# Patient Record
Sex: FEMALE | Race: WHITE
[De-identification: names, ages, dates, MRNs, and addresses within clinical notes are randomized per-mention and may not be internally consistent; named-entity substitution may affect disease eponyms.]

---

## 2018-12-13 ENCOUNTER — HOSPITAL ENCOUNTER (OUTPATIENT)
Dept: HOSPITAL 25 - OR | Age: 63
Discharge: HOME | End: 2018-12-13
Attending: ORTHOPAEDIC SURGERY
Payer: COMMERCIAL

## 2018-12-13 DIAGNOSIS — Z87.891: ICD-10-CM

## 2018-12-13 DIAGNOSIS — M76.821: ICD-10-CM

## 2018-12-13 DIAGNOSIS — M19.90: ICD-10-CM

## 2018-12-13 DIAGNOSIS — M20.11: Primary | ICD-10-CM

## 2018-12-13 PROCEDURE — 76000 FLUOROSCOPY <1 HR PHYS/QHP: CPT

## 2018-12-13 PROCEDURE — C1713 ANCHOR/SCREW BN/BN,TIS/BN: HCPCS

## 2018-12-13 NOTE — OP
Operative Report - Blank





- Operative Report


Date of Operation: 12/13/18


Note: 


PATIENT: Eleanor Powers





YOB: 1955





DATE OF SURGERY: 12/13/2018





SURGEON: Otis Harvey MD





ASSISTANT: MARTINA Menendez, whos assistance was necessary for positioning, 

retraction, help with instrumentation, and closure.





ANESTHESIOLOGIST: Rashmi Baltazar MD





PREOPERATIVE DIAGNOSIS: Right foot hallux valgus deformity





POSTOPERATIVE DIAGNOSIS: Right foot hallux valgus deformity





OPERATION: Right foot hallux valgus correction with distal soft tissue procedure

, medial eminence resection, and proximal suture button fixation.





ANESTHESIA: MAC





IMPLANTS: Arthrex mini tightrope





TOURNIQUET TIME: Less than one hour with an ankle Esmarch tourniquet





SPECIMENS: None





ESTIMATED BLOOD LOSS: Minimal





COMPLICATIONS: none





STATUS: Stable from the operating room to the recovery room and then home.





INDICATIONS FOR PROCEDURE:


Eleanor has had a persistently painful right bunion.  Both operative and non-

operative treatment alternatives were reviewed. Further, the nature and risks 

of surgery were reviewed in careful detail, in the office as well as the pre-

operative holding area. Our discussions regarding the risks of surgery included

, but were not limited to, infection, wound problems, nerve injury, neuroma, RSD

, persistent symptoms, blood clot, recurrence of the hallux valgus deformity, 

hallux varus deformity, imperfect reduction, metatarsal fracture, need for 

further surgery, failure of the surgery, and even the remote chance of 

catastrophic complication, including loss of limb.





DESCRIPTION OF PROCEDURE:


The patient was seen in the preoperative holding unit and informed written 

consent was obtained.  The appropriate extremity was marked. The patient was 

then brought to the operating room and carefully positioned on the operating 

room table. Anesthesia was induced. All bony prominences were padded with great 

care. A chlorhexidine based pre-scrub was performed followed by a chloraprep 

prep and drape in standard sterile fashion. A surgical safety pause was then 

conducted in which we confirmed the appropriate patient, extremity, planned 

procedure, availability of equipment, indication and administration of 

prophylactic antibiotics, and DVT prophylaxis in the form of a compression boot 

on the non-surgical extremity. 





I began with Esmarch exsanguination of the limb and placement of the ankle 

Esmarch tourniquet.  An approximately 3cm longitudinal dorsal incision was made 

in the first webspace.  Blunt dissection was utilized to expose the structures 

of the lateral first MTP joint.  With a scalpel I then sharply released the 

adductor tendon, intermetatarsal ligament, metatarsal-sesamoid ligament, and 

lateral joint capsule.  After this, the hallux came readily over into varus 

with minimal pressure.





I then made an approximately 3cm longitudinal incision over the medial aspect 

of the first MTP joint, centered at the first MTP joint.  Dissection was 

carried down to the capsular layer.  The dorsomedial cutaneous nerve was 

protected throughout.  The capsule was opened in line with the skin incision.  

I then used an oscillating saw to perform an exostectomy of the medial eminence 

of the first MTP head flush with the metaphyseal flare proximally.  I started 

the osteotomy 1mm medial to the sagittal sulcus.  The dorsomedial prominence 

was then contoured with a rongeur.





I then simulated an imbrication of the capsule, which provided excellent visual 

correction of the hallux valgus deformity and intermetatarsal angle.  I used 

fluoroscopy to confirm. As such, I then formally imbricated and repaired the 

medial first MTP joint capsule utilizing several advancing horizontal mattress 

sutures with #1 Vicryl.





I then made an approximately 1 cm incision at the lateral border of the second 

metatarsal.  The lateral cortex of the second metatarsal was exposed.  The 

1.1mm guidewire from the Arthrex mini tightrope set was passed from the lateral 

second metatarsal, through the second metatarsal, and through the first 

metatarsal to exit medially.  I felt four cortices while advancing the 

guidewire.  Then, by pulling the guidewire out medially, I shuttled a passing 

suture through the second and first metatarsals.  I then looped the suture of 

the Arthrex mini tightrope through the passing suture and pulled this out 

laterally through the second metatarsal.  This acted to shuttle the mini 

tightrope through the first and second metatarsals.  The buttons were placed 

flush on the medial aspect of the first metatarsal and lateral aspect of the 

second metatarsal, and the suture was tied over the button laterally at the 

second metatarsal.  Fluoroscopy was used to confirm that this maintained my 

intermetatarsal correction.  Final fluoroscopic images were then obtained.





The tourniquet was released and hemostasis was obtained.


  


The wounds were copiously irrigated and meticulously closed in layers utilizing 

3-0 Monocryl and 3-0 nylon. A sterile dressing was then applied, along with a 

bunion bolster.





The patient was then awakened from anesthesia and transferred to the recovery 

room in stable condition.  There were no complications.  All needle and sponge 

counts were correct at the end of the case.





ATTESTATION: I attest I was present and scrubbed and performed the critical 

portions of the procedure myself.





POSTOPERATIVE PLAN: The patient will remain heel weightbearing in a postop shoe 

for anticipated duration of 6 weeks.  Followup will be in 2 weeks for likely 

suture removal, Steri-Strip application, and reapplication of the bunion 

bolster.

## 2018-12-24 VITALS — SYSTOLIC BLOOD PRESSURE: 117 MMHG | DIASTOLIC BLOOD PRESSURE: 80 MMHG
